# Patient Record
Sex: FEMALE | Race: BLACK OR AFRICAN AMERICAN | NOT HISPANIC OR LATINO | Employment: OTHER | URBAN - METROPOLITAN AREA
[De-identification: names, ages, dates, MRNs, and addresses within clinical notes are randomized per-mention and may not be internally consistent; named-entity substitution may affect disease eponyms.]

---

## 2023-04-19 LAB
CHLAMYDIA TRACH., AMPLIFIED: NEGATIVE
N. GONORRHEA, AMPLIFIED: NEGATIVE
TRICHOMONAS VAGINALIS: NEGATIVE

## 2023-09-26 PROBLEM — J30.9 ALLERGIC RHINITIS: Status: ACTIVE | Noted: 2023-09-26

## 2023-09-26 PROBLEM — J34.3 HYPERTROPHY OF NASAL TURBINATES: Status: ACTIVE | Noted: 2023-09-26

## 2023-09-26 PROBLEM — M54.50 LUMBAGO WITHOUT SCIATICA: Status: ACTIVE | Noted: 2023-09-26

## 2023-09-26 PROBLEM — J34.2 DEVIATED NASAL SEPTUM: Status: ACTIVE | Noted: 2023-09-26

## 2023-09-26 PROBLEM — M99.03 SEGMENTAL AND SOMATIC DYSFUNCTION OF LUMBAR REGION: Status: ACTIVE | Noted: 2023-09-26

## 2023-09-26 PROBLEM — K13.70 MOUTH LESION: Status: ACTIVE | Noted: 2023-09-26

## 2023-09-26 PROBLEM — J35.2 ADENOID HYPERTROPHY: Status: ACTIVE | Noted: 2023-09-26

## 2023-09-26 PROBLEM — H69.90 ETD (EUSTACHIAN TUBE DYSFUNCTION): Status: ACTIVE | Noted: 2023-09-26

## 2023-09-26 PROBLEM — N94.6 DYSMENORRHEA: Status: ACTIVE | Noted: 2023-09-26

## 2023-09-26 PROBLEM — M99.05 SEGMENTAL AND SOMATIC DYSFUNCTION OF PELVIC REGION: Status: ACTIVE | Noted: 2023-09-26

## 2023-09-26 PROBLEM — R63.4 UNINTENDED WEIGHT LOSS: Status: ACTIVE | Noted: 2023-09-26

## 2023-09-26 PROBLEM — R87.610 ATYPICAL SQUAMOUS CELLS OF UNDETERMINED SIGNIFICANCE (ASCUS) ON PAPANICOLAOU SMEAR OF CERVIX: Status: ACTIVE | Noted: 2023-09-26

## 2023-09-26 PROBLEM — M99.02 SEGMENTAL AND SOMATIC DYSFUNCTION OF THORACIC REGION: Status: ACTIVE | Noted: 2023-09-26

## 2023-09-26 RX ORDER — ALBUTEROL SULFATE 90 UG/1
AEROSOL, METERED RESPIRATORY (INHALATION)
COMMUNITY
Start: 2021-12-22

## 2023-09-26 RX ORDER — CYCLOBENZAPRINE HCL 5 MG
5 TABLET ORAL 3 TIMES DAILY
COMMUNITY
Start: 2017-09-08

## 2023-09-26 RX ORDER — NORGESTIMATE AND ETHINYL ESTRADIOL 0.25-0.035
1 KIT ORAL DAILY
COMMUNITY
Start: 2022-04-12 | End: 2024-04-15 | Stop reason: WASHOUT

## 2023-09-26 RX ORDER — IBUPROFEN 600 MG/1
600 TABLET ORAL 3 TIMES DAILY
COMMUNITY
Start: 2017-09-08

## 2023-09-26 RX ORDER — MONTELUKAST SODIUM 10 MG/1
1 TABLET ORAL NIGHTLY
COMMUNITY
Start: 2022-07-25

## 2023-09-26 RX ORDER — IBUPROFEN 200 MG
400 TABLET ORAL EVERY 8 HOURS PRN
COMMUNITY
Start: 2016-12-30

## 2023-09-26 RX ORDER — NORETHINDRONE AND ETHINYL ESTRADIOL 7 DAYS X 3
1 KIT ORAL DAILY
COMMUNITY
End: 2023-10-11 | Stop reason: SDUPTHER

## 2023-09-26 RX ORDER — AZELASTINE 1 MG/ML
1 SPRAY, METERED NASAL 2 TIMES DAILY
COMMUNITY

## 2023-09-26 RX ORDER — ACETAMINOPHEN 325 MG/1
650 TABLET ORAL EVERY 4 HOURS PRN
COMMUNITY
Start: 2016-12-30

## 2023-09-29 NOTE — PROGRESS NOTES
Subjective   Patient ID: Franc Castrejon is a 23 y.o. female who presents October 2, 2023 for No chief complaint on file..    6/15 VPCY    Today, the patient rates their degree of pain as a 4 out of 10 on the numeric pain rating scale.     HPI Diana Bruner returns to my office for chiropractic care. She reports that over the past two weeks she has been experiencing more lower back discomfort than prior. Denies inciting trauma but reports she recently moved homes which may have contributed. Prior to this, patient states she was feeling well overall. No radicular complaints reported. Has been able to workout without issue.    No other changes in health noted.      Objective   Physical Exam  Neurological:      General: No focal deficit present.      Mental Status: She is alert and oriented to person, place, and time.      Cranial Nerves: No dysarthria or facial asymmetry.      Sensory: Sensation is intact.      Motor: Motor function is intact.      Coordination: Coordination is intact.      Gait: Gait is intact. Gait normal.         Palpation of the following region(s) revealed:  Cervical: Upper trapezius bilateral, muscular hypertonicity.  Cervical paraspinals bilateral, muscular hypertonicity.  Thoracic: Thoracic paraspinals bilateral, muscular hypertonicity.  Rhomboids bilateral, muscular hypertonicity.  Pectoralis bilateral, muscular hypertonicity.  Lumbar: Lumbar paraspinals right, hypertonicity and tenderness.  Quadratus lumborum right, hypertonicity and tenderness.  Gluteal bilateral, muscular hypertonicity.  Psoas bilateral, muscular hypertonicity.      Segmental Joint(s): Segmental joint dysfunction was assessed with motion palpation and is identified in the following areas:  Cervical : C7  Thoracic : T1  Lumbopelvic / Sacral SIJ : L4, L5/S1, and R SIJ      Assessment/Plan   Today's Treatment Included: Chiropractic manipulation to the Segmental Joint(s) Cervical : C7 Segmental Joint(s) Lumbopelvic/Sacral SIJ : L4,  L5/S1, and R SIJ Segmental Joint(s) Thoracic : T4, T5, and T8     Pin and stretch  Soft-Tissue manipulation    Techniques: Prone C/S activator; Prone TS mobs; Prone LPS drop table    Soft-tissue mobilization was performed in the following areas:   Upper Trapezius bilateral  Rhomboids bilateral and Pectoralis bilateral  Lumbar Paraspinal mm. bilateral, Quadratus Lumborum bilateral, Gluteal mm. Glute. Max.  and Glute. Med. bilateral, and Psoas bilateral    F/U scheduled with Dr. Joyce in 1 week for different technique.    The patient tolerated today's treatment with little or no additional discomfort and was instructed to contact the office for questions or concerns.

## 2023-10-02 ENCOUNTER — ALLIED HEALTH (OUTPATIENT)
Dept: INTEGRATIVE MEDICINE | Facility: CLINIC | Age: 23
End: 2023-10-02
Payer: COMMERCIAL

## 2023-10-02 DIAGNOSIS — M99.02 SEGMENTAL AND SOMATIC DYSFUNCTION OF THORACIC REGION: ICD-10-CM

## 2023-10-02 DIAGNOSIS — M54.50 BILATERAL LOW BACK PAIN WITHOUT SCIATICA, UNSPECIFIED CHRONICITY: ICD-10-CM

## 2023-10-02 DIAGNOSIS — M99.05 SEGMENTAL AND SOMATIC DYSFUNCTION OF PELVIC REGION: ICD-10-CM

## 2023-10-02 DIAGNOSIS — M99.03 SEGMENTAL AND SOMATIC DYSFUNCTION OF LUMBAR REGION: Primary | ICD-10-CM

## 2023-10-02 DIAGNOSIS — M53.3 SACROILIAC DYSFUNCTION: ICD-10-CM

## 2023-10-02 PROCEDURE — 98941 CHIROPRACT MANJ 3-4 REGIONS: CPT | Performed by: CHIROPRACTOR

## 2023-10-04 ENCOUNTER — TELEPHONE (OUTPATIENT)
Dept: OBSTETRICS AND GYNECOLOGY | Facility: CLINIC | Age: 23
End: 2023-10-04
Payer: COMMERCIAL

## 2023-10-04 DIAGNOSIS — Z30.41 ENCOUNTER FOR SURVEILLANCE OF CONTRACEPTIVE PILLS: Primary | ICD-10-CM

## 2023-10-06 ENCOUNTER — TELEPHONE (OUTPATIENT)
Dept: OBSTETRICS AND GYNECOLOGY | Facility: CLINIC | Age: 23
End: 2023-10-06
Payer: COMMERCIAL

## 2023-10-06 NOTE — TELEPHONE ENCOUNTER
contacted pt  name and  verified  Spoke with pt made aware rx was sent to pharmacy   To call pharmacy to confirm it may be a generic available to her  pt verbalized understanding  no further questions or concerns at this time

## 2023-10-09 ENCOUNTER — ALLIED HEALTH (OUTPATIENT)
Dept: INTEGRATIVE MEDICINE | Facility: CLINIC | Age: 23
End: 2023-10-09
Payer: COMMERCIAL

## 2023-10-09 DIAGNOSIS — M53.3 SACROILIAC DYSFUNCTION: ICD-10-CM

## 2023-10-09 DIAGNOSIS — M99.02 SEGMENTAL AND SOMATIC DYSFUNCTION OF THORACIC REGION: ICD-10-CM

## 2023-10-09 DIAGNOSIS — M99.03 SEGMENTAL AND SOMATIC DYSFUNCTION OF LUMBAR REGION: Primary | ICD-10-CM

## 2023-10-09 DIAGNOSIS — M54.50 BILATERAL LOW BACK PAIN WITHOUT SCIATICA, UNSPECIFIED CHRONICITY: ICD-10-CM

## 2023-10-09 DIAGNOSIS — M99.05 SEGMENTAL AND SOMATIC DYSFUNCTION OF PELVIC REGION: ICD-10-CM

## 2023-10-09 PROCEDURE — 98941 CHIROPRACT MANJ 3-4 REGIONS: CPT | Performed by: CHIROPRACTOR
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
## 2023-10-11 ENCOUNTER — TELEPHONE (OUTPATIENT)
Dept: OBSTETRICS AND GYNECOLOGY | Facility: CLINIC | Age: 23
End: 2023-10-11
Payer: COMMERCIAL

## 2023-10-11 NOTE — TELEPHONE ENCOUNTER
Pt verified by name and .  Nurse calling pt to verify which ocp she is one, 2 are listed in her chart.  Pt states she is taking Nylia .  Pt is aware nurse will send message to Elena Franklin to send rx to pharmacy.  Pt has no further questions or concerns at this time.

## 2023-11-29 ENCOUNTER — TELEPHONE (OUTPATIENT)
Dept: OBSTETRICS AND GYNECOLOGY | Facility: CLINIC | Age: 23
End: 2023-11-29
Payer: COMMERCIAL

## 2023-11-29 DIAGNOSIS — Z30.41 ENCOUNTER FOR SURVEILLANCE OF CONTRACEPTIVE PILLS: ICD-10-CM

## 2024-01-08 ENCOUNTER — ALLIED HEALTH (OUTPATIENT)
Dept: INTEGRATIVE MEDICINE | Facility: CLINIC | Age: 24
End: 2024-01-08
Payer: COMMERCIAL

## 2024-01-08 DIAGNOSIS — M99.02 SEGMENTAL AND SOMATIC DYSFUNCTION OF THORACIC REGION: ICD-10-CM

## 2024-01-08 DIAGNOSIS — M54.50 BILATERAL LOW BACK PAIN WITHOUT SCIATICA, UNSPECIFIED CHRONICITY: ICD-10-CM

## 2024-01-08 DIAGNOSIS — M99.05 SEGMENTAL AND SOMATIC DYSFUNCTION OF PELVIC REGION: ICD-10-CM

## 2024-01-08 DIAGNOSIS — M53.3 SACROILIAC DYSFUNCTION: ICD-10-CM

## 2024-01-08 DIAGNOSIS — M99.03 SEGMENTAL AND SOMATIC DYSFUNCTION OF LUMBAR REGION: Primary | ICD-10-CM

## 2024-01-08 PROCEDURE — 98941 CHIROPRACT MANJ 3-4 REGIONS: CPT | Performed by: CHIROPRACTOR
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
## 2024-01-16 ENCOUNTER — OFFICE VISIT (OUTPATIENT)
Dept: OBSTETRICS AND GYNECOLOGY | Facility: CLINIC | Age: 24
End: 2024-01-16
Payer: COMMERCIAL

## 2024-01-16 VITALS
BODY MASS INDEX: 25.3 KG/M2 | HEIGHT: 63 IN | DIASTOLIC BLOOD PRESSURE: 70 MMHG | SYSTOLIC BLOOD PRESSURE: 110 MMHG | WEIGHT: 142.8 LBS

## 2024-01-16 DIAGNOSIS — Z11.3 ROUTINE SCREENING FOR STI (SEXUALLY TRANSMITTED INFECTION): Primary | ICD-10-CM

## 2024-01-16 PROCEDURE — 87800 DETECT AGNT MULT DNA DIREC: CPT

## 2024-01-16 PROCEDURE — 87661 TRICHOMONAS VAGINALIS AMPLIF: CPT

## 2024-01-16 PROCEDURE — 99213 OFFICE O/P EST LOW 20 MIN: CPT | Performed by: MIDWIFE

## 2024-01-16 RX ORDER — NORGESTIMATE AND ETHINYL ESTRADIOL 0.25-0.035
1 KIT ORAL DAILY
Qty: 28 TABLET | Refills: 4 | Status: SHIPPED | OUTPATIENT
Start: 2024-01-16 | End: 2025-01-15

## 2024-01-16 ASSESSMENT — ENCOUNTER SYMPTOMS
PSYCHIATRIC NEGATIVE: 0
EYES NEGATIVE: 0
CONSTITUTIONAL NEGATIVE: 0
RESPIRATORY NEGATIVE: 0
MUSCULOSKELETAL NEGATIVE: 0
NEUROLOGICAL NEGATIVE: 0
GASTROINTESTINAL NEGATIVE: 0
CARDIOVASCULAR NEGATIVE: 0
HEMATOLOGIC/LYMPHATIC NEGATIVE: 0
ENDOCRINE NEGATIVE: 0
ALLERGIC/IMMUNOLOGIC NEGATIVE: 0

## 2024-01-16 ASSESSMENT — PAIN SCALES - GENERAL: PAINLEVEL: 0-NO PAIN

## 2024-01-16 NOTE — PROGRESS NOTES
22 y/o here for gc/ct screen declines bloodwork.  Also needs refill on ocp- sprintec-28  Will need APE/pap in 04/2024    A/P: 22 y/o  here for sti screen          Encounter for refill ocp    Gc/ct  Sprintec -28 x 4 cycles  Jessica Diaz, ELSIE-CNM

## 2024-01-17 LAB
C TRACH RRNA SPEC QL NAA+PROBE: NEGATIVE
N GONORRHOEA DNA SPEC QL PROBE+SIG AMP: NEGATIVE
T VAGINALIS RRNA SPEC QL NAA+PROBE: NEGATIVE

## 2024-01-30 ENCOUNTER — APPOINTMENT (OUTPATIENT)
Dept: OBSTETRICS AND GYNECOLOGY | Facility: CLINIC | Age: 24
End: 2024-01-30
Payer: COMMERCIAL

## 2024-02-22 DIAGNOSIS — Z30.41 ENCOUNTER FOR SURVEILLANCE OF CONTRACEPTIVE PILLS: ICD-10-CM

## 2024-02-22 NOTE — TELEPHONE ENCOUNTER
Patient request OCP refill.  Was last seen in May 2023, has appointment scheduled in April.  Med pended to provider to sign.

## 2024-03-12 NOTE — ADDENDUM NOTE
Addended by: SUZAN SÁNCHEZ on: 10/11/2023 02:45 PM     Modules accepted: Orders     oriented to person, place and time

## 2024-04-02 ENCOUNTER — APPOINTMENT (OUTPATIENT)
Dept: OBSTETRICS AND GYNECOLOGY | Facility: CLINIC | Age: 24
End: 2024-04-02
Payer: COMMERCIAL

## 2024-04-15 ENCOUNTER — OFFICE VISIT (OUTPATIENT)
Dept: OBSTETRICS AND GYNECOLOGY | Facility: CLINIC | Age: 24
End: 2024-04-15
Payer: COMMERCIAL

## 2024-04-15 ENCOUNTER — TELEPHONE (OUTPATIENT)
Dept: OBSTETRICS AND GYNECOLOGY | Facility: CLINIC | Age: 24
End: 2024-04-15

## 2024-04-15 VITALS
SYSTOLIC BLOOD PRESSURE: 110 MMHG | WEIGHT: 138.38 LBS | BODY MASS INDEX: 24.51 KG/M2 | DIASTOLIC BLOOD PRESSURE: 60 MMHG

## 2024-04-15 DIAGNOSIS — Z01.419 WELL WOMAN EXAM: Primary | ICD-10-CM

## 2024-04-15 DIAGNOSIS — Z30.41 ENCOUNTER FOR SURVEILLANCE OF CONTRACEPTIVE PILLS: ICD-10-CM

## 2024-04-15 DIAGNOSIS — Z12.4 SCREENING FOR CERVICAL CANCER: ICD-10-CM

## 2024-04-15 PROCEDURE — 99395 PREV VISIT EST AGE 18-39: CPT | Performed by: ADVANCED PRACTICE MIDWIFE

## 2024-04-15 PROCEDURE — 88141 CYTOPATH C/V INTERPRET: CPT | Performed by: PATHOLOGY

## 2024-04-15 PROCEDURE — 87624 HPV HI-RISK TYP POOLED RSLT: CPT

## 2024-04-15 PROCEDURE — 88175 CYTOPATH C/V AUTO FLUID REDO: CPT

## 2024-04-15 ASSESSMENT — PAIN SCALES - GENERAL: PAINLEVEL: 0-NO PAIN

## 2024-04-15 NOTE — PROGRESS NOTES
Assessment/Plan   Problem List Items Addressed This Visit    None  Visit Diagnoses         Codes    Well woman exam    -  Primary Z01.419    Screening for cervical cancer     Z12.4            Brigette Sanz, APRN-CNM     Subjective   Franc Castrejon is a 24 y.o. female who is here for a routine exam. Periods are regular every 28-30 days, lasting 5 days. Dysmenorrhea:moderate, occurring premenstrually. Cyclic symptoms include breast tenderness. No intermenstrual bleeding, spotting, or discharge.    Current contraception: condoms  History of abnormal Pap smear: yes - biopsy done, last year pap normal  History of LEEP or cryo:  no  Family history of uterine or ovarian cancer: no  Family history of breast cancer: no  Regular self breast exam: yes  History of abnormal mammogram: no    She has issues with lubrication: no  She reports issue with orgasms: no  She reports pain with sexual activity: no  She reports sexual activity with: (male 1)    ROS    Objective   /60   LMP 03/18/2024 (Approximate)     General:   Alert and oriented x 3   Heart:  Thyroid: Regular rate, rhythm  Euthyroid, normal shape and size   Lungs:  Breast: Clear to auscultation bilaterally  Symmetrical, no skin changes/nipple discharge, redness, tenderness, no masses palpated bilaterally   Abdomen: Soft, non tender   Vulva: EGBUS normal   Vagina: Pink, normal discharge   Cervix: No CMT   Uterus: Normal shape, size   Adnexa: NT bilaterally       Thank you for coming to see me for your visit today and most importantly thank you for having a preventative visit.  If lab work was sent, you will see your test result via [facility]  Any prescriptions will be sent electronically to your pharmacy listed    I look forward to seeing you next year for your health care needs.  Please remember:   Sleep is important!   Exercise such as walking for 20 minutes per day is beneficial to your physical and mental health   Sleep is so important and should be a  priority!   Healthy diets can be expensive -- if you every feel like you are struggling to afford healthy food, please let me know as we have a very good program called [employer] that is available to you   Decrease alcohol and tobacco    Be well!  Xochitl

## 2024-04-15 NOTE — TELEPHONE ENCOUNTER
Contacted pt and verified name and .  Pt confirmed she takes Notrel, pt was in office today for a visit.  Order has been sent to her provider, pt made aware to allow provider time to sign order.  Pt states understanding and denies having questions at this time.

## 2024-04-17 ENCOUNTER — TELEPHONE (OUTPATIENT)
Dept: OBSTETRICS AND GYNECOLOGY | Facility: CLINIC | Age: 24
End: 2024-04-17
Payer: COMMERCIAL

## 2024-04-17 DIAGNOSIS — N39.0 URINARY TRACT INFECTION WITHOUT HEMATURIA, SITE UNSPECIFIED: ICD-10-CM

## 2024-04-17 NOTE — TELEPHONE ENCOUNTER
"Contacted pt and verified name and .  Pt states since she had her pap and pelvic exam done on Monday she has been feeling \"different down there\". Pt states she feels pressure and urinary urgency but she isnt voiding. Pt states she has burning when she urinates.   Pt made message will be forwarded to provider.  Pt states understanding and denies having questions at this time.    "

## 2024-04-17 NOTE — TELEPHONE ENCOUNTER
Attempted to call pt for medical question.  Pt did not answer, left VM to return call to clinic.

## 2024-04-18 ENCOUNTER — CLINICAL SUPPORT (OUTPATIENT)
Dept: OBSTETRICS AND GYNECOLOGY | Facility: CLINIC | Age: 24
End: 2024-04-18
Payer: COMMERCIAL

## 2024-04-18 DIAGNOSIS — R39.15 URINARY URGENCY: Primary | ICD-10-CM

## 2024-04-18 DIAGNOSIS — R39.15 URINARY URGENCY: ICD-10-CM

## 2024-04-18 PROCEDURE — 87086 URINE CULTURE/COLONY COUNT: CPT

## 2024-04-18 PROCEDURE — 81001 URINALYSIS AUTO W/SCOPE: CPT

## 2024-04-18 NOTE — PROGRESS NOTES
Pt verified by name and .  P her to leave urine specimen for urinalysis, urine culture.  Pt has no questions

## 2024-04-19 LAB
APPEARANCE UR: CLEAR
BACTERIA #/AREA URNS AUTO: ABNORMAL /HPF
BACTERIA UR CULT: NORMAL
BILIRUB UR STRIP.AUTO-MCNC: NEGATIVE MG/DL
COLOR UR: COLORLESS
GLUCOSE UR STRIP.AUTO-MCNC: NORMAL MG/DL
KETONES UR STRIP.AUTO-MCNC: NEGATIVE MG/DL
LEUKOCYTE ESTERASE UR QL STRIP.AUTO: NEGATIVE
NITRITE UR QL STRIP.AUTO: NEGATIVE
PH UR STRIP.AUTO: 6.5 [PH]
PROT UR STRIP.AUTO-MCNC: NEGATIVE MG/DL
RBC # UR STRIP.AUTO: ABNORMAL /UL
RBC #/AREA URNS AUTO: ABNORMAL /HPF
SP GR UR STRIP.AUTO: 1
SQUAMOUS #/AREA URNS AUTO: ABNORMAL /HPF
UROBILINOGEN UR STRIP.AUTO-MCNC: NORMAL MG/DL
WBC #/AREA URNS AUTO: ABNORMAL /HPF

## 2024-04-26 LAB
CYTOLOGY CMNT CVX/VAG CYTO-IMP: NORMAL
HPV HR 12 DNA GENITAL QL NAA+PROBE: POSITIVE
HPV HR GENOTYPES PNL CVX NAA+PROBE: POSITIVE
HPV16 DNA SPEC QL NAA+PROBE: NEGATIVE
HPV18 DNA SPEC QL NAA+PROBE: NEGATIVE
LAB AP HPV GENOTYPE QUESTION: YES
LAB AP HPV HR: NORMAL
LAB AP PREVIOUS ABNORMAL HISTORY: NORMAL
LABORATORY COMMENT REPORT: NORMAL
LMP START DATE: NORMAL
PATH REPORT.TOTAL CANCER: NORMAL

## 2024-04-29 ENCOUNTER — TELEPHONE (OUTPATIENT)
Dept: OBSTETRICS AND GYNECOLOGY | Facility: CLINIC | Age: 24
End: 2024-04-29
Payer: COMMERCIAL

## 2024-04-29 NOTE — TELEPHONE ENCOUNTER
Name/ verified  Pt informed of her recent PAP results with +HPV high risk. Education provided to pt on HPV. Pt needs Colpo. Informed pt that Colposcopy is a way of looking at the cervix through a special magnifying device called a Colposcope. It shines a light onto the vagina and cervix. The provider will pour a vinegar like solution over the cervix and if any abnormal cells show, then a small biopsy will be taken and sent to the lab. It is normal to have some mild cramping and spotting/bleeding after if a sample was taken.  Pt may use Motrin prior to arrival.  Pt scheduled Colpo and verbalized understanding.

## 2024-05-28 ENCOUNTER — OFFICE VISIT (OUTPATIENT)
Dept: OBSTETRICS AND GYNECOLOGY | Facility: CLINIC | Age: 24
End: 2024-05-28
Payer: COMMERCIAL

## 2024-05-28 VITALS
HEIGHT: 63 IN | SYSTOLIC BLOOD PRESSURE: 110 MMHG | WEIGHT: 142 LBS | DIASTOLIC BLOOD PRESSURE: 70 MMHG | BODY MASS INDEX: 25.16 KG/M2

## 2024-05-28 DIAGNOSIS — R87.610 ASCUS WITH POSITIVE HIGH RISK HPV CERVICAL: Primary | ICD-10-CM

## 2024-05-28 DIAGNOSIS — R87.810 ASCUS WITH POSITIVE HIGH RISK HPV CERVICAL: Primary | ICD-10-CM

## 2024-05-28 LAB — PREGNANCY TEST URINE, POC: NEGATIVE

## 2024-05-28 PROCEDURE — 57454 BX/CURETT OF CERVIX W/SCOPE: CPT | Performed by: ADVANCED PRACTICE MIDWIFE

## 2024-05-28 PROCEDURE — 81025 URINE PREGNANCY TEST: CPT | Performed by: ADVANCED PRACTICE MIDWIFE

## 2024-05-28 ASSESSMENT — PAIN SCALES - GENERAL: PAINLEVEL: 0-NO PAIN

## 2024-05-28 NOTE — PROGRESS NOTES
Colposcopy    Date/Time: 5/28/2024 2:21 PM    Performed by: MACI Jones  Authorized by: MACI Jones    Consent:     Patient questions answered: yes      Risks and benefits of the procedure and its alternatives discussed: yes      Procedural risks discussed:  Bleeding and infection    Consent obtained:  Verbal    Consent given by:  Patient  Pre-procedure:     Prep solution(s): acetic acid    Procedure:     Colposcopy with: cervical biopsy and endocervical curettage      Cervix visibility: fully visualized      SCJ visibility: fully visualized      Lesion visualized: fully visualized      Acetowhite lesion(s): cervix      Acetowhite lesion(s) comment:  None    Acetowhite lesion(s) description:  Small area 12 o'clock - changes with acetowhite c/w IKE 1    Vascular changes comment:  None    Tampon inserted: no    Post-procedure:     Patient tolerance of procedure:  Patient tolerated the procedure well with no immediate complications    Instructions and paperwork completed: yes

## 2024-06-18 LAB
LAB AP ASR DISCLAIMER: NORMAL
LABORATORY COMMENT REPORT: NORMAL
PATH REPORT.COMMENTS IMP SPEC: NORMAL
PATH REPORT.FINAL DX SPEC: NORMAL
PATH REPORT.GROSS SPEC: NORMAL
PATH REPORT.RELEVANT HX SPEC: NORMAL
PATH REPORT.TOTAL CANCER: NORMAL
RESIDENT REVIEW: NORMAL

## 2024-06-18 PROCEDURE — 88342 IMHCHEM/IMCYTCHM 1ST ANTB: CPT

## 2024-06-19 ENCOUNTER — TELEPHONE (OUTPATIENT)
Dept: OBSTETRICS AND GYNECOLOGY | Facility: CLINIC | Age: 24
End: 2024-06-19

## 2024-06-19 NOTE — TELEPHONE ENCOUNTER
----- Message from MACI Jones sent at 6/18/2024  3:47 PM EDT -----  Needs appointment with MD for a LEEP - please notify and schedule

## 2024-07-17 ENCOUNTER — TELEPHONE (OUTPATIENT)
Dept: OBSTETRICS AND GYNECOLOGY | Facility: CLINIC | Age: 24
End: 2024-07-17

## 2024-07-17 NOTE — TELEPHONE ENCOUNTER
Called pt, no answer, left voicemail for return call  Pt has refills at pharmacy ready to request refill from both [facility]  Left number to [facility] so pt can call  [phone]

## 2024-08-14 ENCOUNTER — APPOINTMENT (OUTPATIENT)
Dept: OBSTETRICS AND GYNECOLOGY | Facility: HOSPITAL | Age: 24
End: 2024-08-14

## 2024-08-21 ENCOUNTER — PROCEDURE VISIT (OUTPATIENT)
Dept: OBSTETRICS AND GYNECOLOGY | Facility: HOSPITAL | Age: 24
End: 2024-08-21
Payer: MEDICARE

## 2024-08-21 VITALS
WEIGHT: 153.2 LBS | OXYGEN SATURATION: 99 % | SYSTOLIC BLOOD PRESSURE: 122 MMHG | TEMPERATURE: 98 F | RESPIRATION RATE: 15 BRPM | HEART RATE: 99 BPM | DIASTOLIC BLOOD PRESSURE: 73 MMHG | BODY MASS INDEX: 27.14 KG/M2

## 2024-08-21 DIAGNOSIS — N87.1 DYSPLASIA OF CERVIX, HIGH GRADE CIN 2: Primary | ICD-10-CM

## 2024-08-21 DIAGNOSIS — N89.8 VAGINAL DISCHARGE: ICD-10-CM

## 2024-08-21 LAB — PREGNANCY TEST URINE, POC: NEGATIVE

## 2024-08-21 PROCEDURE — 81025 URINE PREGNANCY TEST: CPT | Performed by: OBSTETRICS & GYNECOLOGY

## 2024-08-21 PROCEDURE — 57460 BX OF CERVIX W/SCOPE LEEP: CPT | Performed by: OBSTETRICS & GYNECOLOGY

## 2024-08-21 PROCEDURE — 87205 SMEAR GRAM STAIN: CPT

## 2024-08-21 PROCEDURE — 81025 URINE PREGNANCY TEST: CPT

## 2024-08-21 ASSESSMENT — ENCOUNTER SYMPTOMS
CARDIOVASCULAR NEGATIVE: 0
CONSTITUTIONAL NEGATIVE: 0
RESPIRATORY NEGATIVE: 0
EYES NEGATIVE: 0
GASTROINTESTINAL NEGATIVE: 0
PSYCHIATRIC NEGATIVE: 0
ABDOMINAL PAIN: 0
NEUROLOGICAL NEGATIVE: 0
ENDOCRINE NEGATIVE: 0
HEMATOLOGIC/LYMPHATIC NEGATIVE: 0
PSYCHIATRIC NEGATIVE: 1
MUSCULOSKELETAL NEGATIVE: 0
ALLERGIC/IMMUNOLOGIC NEGATIVE: 0
UNEXPECTED WEIGHT CHANGE: 0

## 2024-08-21 ASSESSMENT — PAIN SCALES - GENERAL: PAINLEVEL: 0-NO PAIN

## 2024-08-21 NOTE — PROGRESS NOTES
Subjective   Pt is doing well today. Has been having some increased discharge over the past few weeks that has a slight odor to it. Otherwise doing well, no other complaints at this time.     Patient ID: Franc Castrejon is a 24 y.o. female who presents for Procedure (LEEP procedure/Last PAP 4/2024 ascus, HPV+/Colpo IKE 2 on 5/28/24\/Chaperone: Declined//Oliva Hallman MSN, RN, CLC )    Prior history as follows:    2021 ASCUS, no HPV results  2022 Normal pap   4/2023 ASCUS, no HPV results  5/2023 endocervical curetting with benign fragments; 9 o'clock biopsy endocervical musoca with chronic inflammation and no squamous mucosa identified  4/2024 ASCUS, HPV high risk and non- 16/18 positive  5/2024 Colpo with IKE 2 at 12 o'clock    Smoking status: never a smoker  Contraception: OCPs  HPV vaccination: yes complete in [id number]  HIV status: is HIV negative in 10/2022    Review of Systems   Constitutional:  Negative for unexpected weight change.   Gastrointestinal:  Negative for abdominal pain.   Genitourinary:  Negative for vaginal bleeding.   Psychiatric/Behavioral: Negative.        Physical Exam   Constitutional: No visible distress, alert and cooperative  Eyes: clear sclera  Head/Neck: Normocephalic  Respiratory/Thorax: Normal respiratory effort on RA  Musculoskeletal: grossly normal ROM  Extremities: BLEs symmetrical   Neurological: A&Ox3  Psychological: Appropriate mood and behavior  Skin: warm, dry, no lesions     Colposcopy    Date/Time: 8/21/2024 2:45 PM    Performed by: Jenny Hancock MD  Authorized by: Corinne A Bazella, MD    Procedure location: cervix    Consent:     Patient questions answered: yes      Risks and benefits of the procedure and its alternatives discussed: yes      Procedural risks discussed:  Bleeding, repeat procedure and failure rate    Consent obtained:  Written    Consent given by:  Patient  Indication:     Cervical indication(s): IKE 2      Other indication(s): clinical abnormality     Pre-procedure:     Speculum was placed in the vagina: yes      Prep solution(s): acetic acid and Lugol's iodine      Local anesthetic:  Lidocaine 1% WITH epi    Total amount used (mL):  20  Procedure:     Colposcopy with: LEEP      Cervix visibility: fully visualized      SCJ visibility: fully visualized      Lesion visualized: fully visualized      Acetowhite lesion(s): cervix      Cervical impression: high grade      Intracervical block was performed: yes      Speculum meets coating standards for use with lasers: yes      Electrocautery: blend      Cautery loop used: yes      Cautery loop depth of excision (mm):  8    Cautery loop radius of excision (mm):  15    Top Hat performed: no      Endometrial sampling: curettage      Cautery location(s): cervical edge      Margin extension of the exocervix (mm):  1    Ball cautery with coag current performed: yes      LEEP details:  Adequate colposcopy performed after application of dilute acetic acid and Lugol's solution to the cervix.  There were some acetowhite changes noted at 12 o'clock.  LEEP was taken from 12 o'clock to 6 o'clock with full excision of lesion and an ECC was also obtained.  Monopolar coagulation, Monsel's, and pressure were used for hemostasis.  The patient tolerated the procedure well.    Ferric subsulfate solution applied: no      Tampon inserted: no    Post-procedure:     Patient tolerance of procedure:  Patient tolerated the procedure well with no immediate complications    Estimated blood loss (mL):  5    Instructions and paperwork completed: yes      Educational handouts given: no                Assessment:  Colposcopic impression:  Master index  Color 1  Margins 1  Vessels 0  Iodine staining 1  Total 3, consistent with IKE 1-2.    PLAN: LEEP performed, will order pathology study and plan to repeat pap in 6 months.     Pt with increased discharge with slight odor, collected vaginitis swab for study.     Supriya Hancock MD PGY-1

## 2024-08-22 DIAGNOSIS — N76.0 BV (BACTERIAL VAGINOSIS): Primary | ICD-10-CM

## 2024-08-22 DIAGNOSIS — B96.89 BV (BACTERIAL VAGINOSIS): Primary | ICD-10-CM

## 2024-08-22 LAB
CLUE CELLS VAG LPF-#/AREA: PRESENT /[LPF]
NUGENT SCORE: 8
YEAST VAG WET PREP-#/AREA: ABNORMAL

## 2024-08-22 RX ORDER — METRONIDAZOLE 500 MG/1
500 TABLET ORAL 2 TIMES DAILY
Qty: 14 TABLET | Refills: 0 | Status: SHIPPED | OUTPATIENT
Start: 2024-08-22 | End: 2024-08-29

## 2024-08-28 ENCOUNTER — TELEPHONE (OUTPATIENT)
Dept: OBSTETRICS AND GYNECOLOGY | Facility: CLINIC | Age: 24
End: 2024-08-28
Payer: MEDICARE

## 2024-08-28 ENCOUNTER — TELEPHONE (OUTPATIENT)
Dept: OBSTETRICS AND GYNECOLOGY | Facility: HOSPITAL | Age: 24
End: 2024-08-28
Payer: MEDICARE

## 2024-08-28 NOTE — TELEPHONE ENCOUNTER
Name/ verified  RN called pt to follow up with recent office visit billing.  RN was on phone with pt and then disconnected.

## 2024-08-28 NOTE — TELEPHONE ENCOUNTER
Name/ verified  Pt and pt mother on phone. Pt is concerned with receiving a bill for her LEEP procedure. She states that she was told by someone she spoke with that her insurance would cover the procedure.  Apologies provided. RN provided pt with Billing Department phone number to assist with this matter.

## 2024-09-03 LAB
LAB AP ASR DISCLAIMER: NORMAL
LABORATORY COMMENT REPORT: NORMAL
PATH REPORT.FINAL DX SPEC: NORMAL
PATH REPORT.GROSS SPEC: NORMAL
PATH REPORT.RELEVANT HX SPEC: NORMAL
PATH REPORT.TOTAL CANCER: NORMAL

## 2024-09-04 ENCOUNTER — TELEPHONE (OUTPATIENT)
Dept: OBSTETRICS AND GYNECOLOGY | Facility: CLINIC | Age: 24
End: 2024-09-04
Payer: MEDICARE

## 2024-09-04 NOTE — TELEPHONE ENCOUNTER
Name/ verified  Rn returned phone call to F/U if pt has spoke with billing in regards to recent procedure (cost). Pt stated she spoke with someone from billing yesterday and they would be calling pt back. Pt will schedule F/U apt in 4-6 wks.

## 2024-09-10 ENCOUNTER — TELEPHONE (OUTPATIENT)
Dept: OBSTETRICS AND GYNECOLOGY | Facility: HOSPITAL | Age: 24
End: 2024-09-10
Payer: MEDICARE

## 2024-09-10 NOTE — TELEPHONE ENCOUNTER
----- Message from Ananda Valdez sent at 9/9/2024  8:08 PM EDT -----  Regarding: FUV from LEEP  Hello could this patient be scheduled with any obgyn physician post op from leep. In the next two weeks is fine    Contacted patient and scheduled her for follow up visit after LEEP procedure 9/30 with Dr. Doran  Patient states she is having insurance trouble and may not be able to make it to appointment if her insurance is out of network  Instructed patient to call office if anything changes but that we do recommend she come in for POV if she is able to  Patient verbalized understanding  Debbie Cronin RN

## 2024-09-30 ENCOUNTER — OFFICE VISIT (OUTPATIENT)
Dept: OBSTETRICS AND GYNECOLOGY | Facility: HOSPITAL | Age: 24
End: 2024-09-30
Payer: MEDICARE

## 2024-09-30 VITALS — BODY MASS INDEX: 26.75 KG/M2 | SYSTOLIC BLOOD PRESSURE: 115 MMHG | WEIGHT: 151 LBS | DIASTOLIC BLOOD PRESSURE: 74 MMHG

## 2024-09-30 DIAGNOSIS — D06.9 CIN III WITH SEVERE DYSPLASIA: Primary | ICD-10-CM

## 2024-09-30 PROCEDURE — 99212 OFFICE O/P EST SF 10 MIN: CPT | Performed by: OBSTETRICS & GYNECOLOGY

## 2024-09-30 PROCEDURE — 1036F TOBACCO NON-USER: CPT | Performed by: OBSTETRICS & GYNECOLOGY

## 2024-09-30 ASSESSMENT — ENCOUNTER SYMPTOMS
DEPRESSION: 0
OCCASIONAL FEELINGS OF UNSTEADINESS: 0
LOSS OF SENSATION IN FEET: 0

## 2024-09-30 ASSESSMENT — PAIN SCALES - GENERAL: PAINLEVEL: 0-NO PAIN

## 2024-09-30 NOTE — PROGRESS NOTES
Subjective   Patient ID: Franc Castrejon is a 24 y.o. female who presents for Follow-up (Patient here for follow up after LEEP/Patient had LEEP 8-21-24/LMP 9-6-24/Patient denies falls/Patient denies pain/Patient has no concerns after LEEP).  Here for follow-up after LEEP.   Path A. Cervix, LEEP:  -- High grade squamous intraepithelial lesion (HSIL / IKE 2-3), extending into endocervical glands, involving squamocolumnar mucosa.  -- Immunohistochemical stain performed on formalin-fixed, paraffin embedded section (blocks A2 and A3) demonstrates p16 to be positive (block pattern) in dysplastic cells.   -- Ectocervical, endocervical and stromal (deep) margins are negative for dysplasia.  -- Multiple levels examined for final diagnosis.      B. Endocervix, curettage:  -- Fragments of cytologically benign endocervix.     No complaints. Discussed follow-up Pap in 6 months.   Has been sexually active since procedure with no concerns.     Review of Systems   All other systems reviewed and are negative.    Objective   Physical Exam  Constitutional:       Appearance: Normal appearance. She is normal weight.   HENT:      Head: Normocephalic and atraumatic.   Genitourinary:     General: Normal vulva.      Exam position: Lithotomy position.      Vagina: Normal.      Cervix: Normal.   Musculoskeletal:         General: Normal range of motion.   Skin:     General: Skin is warm and dry.   Neurological:      General: No focal deficit present.      Mental Status: She is alert and oriented to person, place, and time.   Psychiatric:         Mood and Affect: Mood normal.         Thought Content: Thought content normal.       Assessment/Plan   Problem List Items Addressed This Visit    None  Visit Diagnoses         Codes    IKE III with severe dysplasia    -  Primary D06.9        Follow-up Pap 5 months.          Chloe Doran MD 09/30/24 3:25 PM

## 2024-10-28 ENCOUNTER — HOSPITAL ENCOUNTER (EMERGENCY)
Facility: HOSPITAL | Age: 24
Discharge: OTHER INSTITUTION | End: 2024-10-28
Payer: MEDICARE

## 2024-10-28 PROCEDURE — 4500999001 HC ED NO CHARGE

## 2025-03-31 ENCOUNTER — APPOINTMENT (OUTPATIENT)
Dept: OBSTETRICS AND GYNECOLOGY | Facility: HOSPITAL | Age: 25
End: 2025-03-31
Payer: MEDICARE

## 2025-04-01 ENCOUNTER — TELEPHONE (OUTPATIENT)
Dept: OBSTETRICS AND GYNECOLOGY | Facility: HOSPITAL | Age: 25
End: 2025-04-01
Payer: MEDICARE

## 2025-04-01 NOTE — TELEPHONE ENCOUNTER
----- Message from Ananda Valdez sent at 9/9/2024  8:08 PM EDT -----  Regarding: FUV from [facility]  Hello could this patient be scheduled with any obgyn physician post op from [facility]. In the next two weeks is fine

## 2025-04-01 NOTE — TELEPHONE ENCOUNTER
RN called patient to schedule annual and repeat pap. Pt name and  verified. Pt states she was scheduled, but someone from  called to cancel her appt due to her insurance not covering the visit. Pt is looking for gyn care somewhere that is covered by her insurance. Pt advised to contact her insurance to see where her insurance will be accepted and to schedule an appt for a annual and pap. Pt encourage to continue with her gyn care because she had a LEEP leap last year. Pt verbalized understanding. All questions and concerns addressed.  RHINA Moy

## 2025-04-22 ENCOUNTER — TELEPHONE (OUTPATIENT)
Dept: OBSTETRICS AND GYNECOLOGY | Facility: CLINIC | Age: 25
End: 2025-04-22
Payer: MEDICARE

## 2025-04-23 NOTE — TELEPHONE ENCOUNTER
Nurse called patient's listed telephone number [phone] in attempt to address b/c questions but nurse received patient's voicemail and left a message asking for a callback at the office. Office number provided.    RHINA Castrejon

## 2025-05-29 ENCOUNTER — APPOINTMENT (OUTPATIENT)
Dept: OBSTETRICS AND GYNECOLOGY | Facility: CLINIC | Age: 25
End: 2025-05-29
Payer: MEDICARE

## 2025-06-01 NOTE — PROGRESS NOTES
"Assessment/Plan   {Assess/PlanSmarinks:59226}    Follow up in 1 year for annual well woman exam, or sooner as needed.  Melissa L Zahorsky, ELSIE-LUIS A     Subjective   Franc Castrejon is a 25 y.o. female who is here for a routine annual exam and repeat pap s/p LEEP procedure.     Age at menarche:  No LMP recorded.  Periods are {gyn period regularity:715}, lasting {numbers; 0-10:15658} days. Dysmenorrhea: {gyn dysmenorrhea:716}. Cyclic symptoms include {sys gyn cyclic sx:51675}. Denies intermenstrual bleeding, spotting, or discharge.    {Childbearing or Misti/postmenopausal?:90328}    Sexual Activity: {Sexual activity (female):61851}  Pain with intercourse? {Yes/No:70618}  Loss of desire? {Yes/No:22012}  Able to have an orgasm? {Yes/No:95258}    History of prior STI: {STI:23817}  Desires STI screening? {yes/no:24526}    Current contraception: {contraceptive method:5051}  Happy with it?   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   Current/past IPV?   Occupation:   Exercise:   Alcohol/Tobacco/Drug use:    OB History   No obstetric history on file.        Objective   There were no vitals taken for this visit.    General: Pleasant, cooperative, in no apparent distress.  Thyroid: Normal size, symmetrical, smooth and non-tender to palpation.  CV: Regular rate and rhythm.  Resp: Normal respiratory effort. Clear to auscultation bilaterally.  Breasts: Symmetrical, no skin changes, nipple discharge, erythema, masses. Non-tender to palpation.  Abdomen: Soft, non-tender.  : Normal external genitalia " appearance with even hair distribution. Adnexa without tenderness or masses. Uterus firm, midline, mobile, and appropriate size. Vaginal walls pink and rugated with small amount of white, physiologic discharge. Cervix pink and firm. No lesions, no petechiae, no CMT.         Thank you for coming to see me for your visit today and, most importantly, thank you for having a preventative visit.  If lab work was sent, you will see your test result via [employer] and will be notified of any pertinent abnormalities.  Any prescriptions will be sent electronically to your pharmacy listed.  Follow up in 1 year for your annual exam or sooner as needed.    I look forward to seeing you next year for your health care needs.  Please remember:   Sleep is important - make it a priority to get at least 6 hours per night!   Exercise, such as walking for 20 minutes per day, is beneficial to your physical and mental health.   Healthy diets can be expensive. If you ever feel like you are struggling to afford healthy food, please let me know, as we have a very good program called [employer] that is available to you.   Decrease alcohol and tobacco intake. A goal of less than 7 alcoholic drinks per week can reduce your risk of breast and colon cancer by 38%!    Be well!  Marla

## 2025-06-02 ENCOUNTER — APPOINTMENT (OUTPATIENT)
Dept: OBSTETRICS AND GYNECOLOGY | Facility: CLINIC | Age: 25
End: 2025-06-02

## 2025-06-02 ENCOUNTER — APPOINTMENT (OUTPATIENT)
Dept: OBSTETRICS AND GYNECOLOGY | Facility: CLINIC | Age: 25
End: 2025-06-02
Payer: MEDICARE

## 2025-06-02 VITALS
DIASTOLIC BLOOD PRESSURE: 70 MMHG | WEIGHT: 153.8 LBS | HEIGHT: 63 IN | BODY MASS INDEX: 27.25 KG/M2 | SYSTOLIC BLOOD PRESSURE: 108 MMHG

## 2025-06-02 DIAGNOSIS — N89.8 VAGINAL ITCHING: ICD-10-CM

## 2025-06-02 DIAGNOSIS — Z12.4 CERVICAL CANCER SCREENING: ICD-10-CM

## 2025-06-02 DIAGNOSIS — Z01.419 WELL WOMAN EXAM: Primary | ICD-10-CM

## 2025-06-02 DIAGNOSIS — Z30.41 ENCOUNTER FOR SURVEILLANCE OF CONTRACEPTIVE PILLS: ICD-10-CM

## 2025-06-02 DIAGNOSIS — R30.0 DYSURIA: ICD-10-CM

## 2025-06-02 LAB
POC APPEARANCE, URINE: ABNORMAL
POC BILIRUBIN, URINE: ABNORMAL
POC BLOOD, URINE: NEGATIVE
POC COLOR, URINE: ABNORMAL
POC GLUCOSE, URINE: NEGATIVE MG/DL
POC KETONES, URINE: ABNORMAL MG/DL
POC LEUKOCYTES, URINE: ABNORMAL
POC NITRITE,URINE: NEGATIVE
POC PH, URINE: 7 PH
POC PROTEIN, URINE: ABNORMAL MG/DL
POC SPECIFIC GRAVITY, URINE: 1.02
POC UROBILINOGEN, URINE: 4 EU/DL

## 2025-06-02 PROCEDURE — 81003 URINALYSIS AUTO W/O SCOPE: CPT

## 2025-06-02 PROCEDURE — 87591 N.GONORRHOEAE DNA AMP PROB: CPT

## 2025-06-02 PROCEDURE — 87661 TRICHOMONAS VAGINALIS AMPLIF: CPT

## 2025-06-02 PROCEDURE — 3008F BODY MASS INDEX DOCD: CPT

## 2025-06-02 PROCEDURE — 87491 CHLMYD TRACH DNA AMP PROBE: CPT

## 2025-06-02 PROCEDURE — 1036F TOBACCO NON-USER: CPT

## 2025-06-02 PROCEDURE — 99395 PREV VISIT EST AGE 18-39: CPT

## 2025-06-02 RX ORDER — CLINDAMYCIN PHOSPHATE 20 MG/G
1 CREAM VAGINAL NIGHTLY
Qty: 40 G | Refills: 0 | Status: SHIPPED | OUTPATIENT
Start: 2025-06-02 | End: 2025-06-09

## 2025-06-02 RX ORDER — NORETHINDRONE AND ETHINYL ESTRADIOL 1 MG-35MCG
1 KIT ORAL DAILY
Qty: 84 TABLET | Refills: 12 | Status: SHIPPED | OUTPATIENT
Start: 2025-06-02

## 2025-06-02 ASSESSMENT — ENCOUNTER SYMPTOMS
LOSS OF SENSATION IN FEET: 0
DEPRESSION: 0
OCCASIONAL FEELINGS OF UNSTEADINESS: 0

## 2025-06-02 ASSESSMENT — PAIN SCALES - GENERAL: PAINLEVEL_OUTOF10: 0-NO PAIN

## 2025-06-02 NOTE — PATIENT INSTRUCTIONS
Thank you for coming to see me for your visit today and, most importantly, thank you for having a preventative visit.  If lab work was sent, you will see your test result via [employer] and will be notified of any pertinent abnormalities.  Any prescriptions will be sent electronically to your pharmacy listed.  Follow up in 1 year for your annual exam or sooner as needed.    I look forward to seeing you next year for your health care needs.  Please remember:   Sleep is important - make it a priority to get at least 6 hours per night!   Exercise, such as walking for 20 minutes per day, is beneficial to your physical and mental health.   Healthy diets can be expensive. If you ever feel like you are struggling to afford healthy food, please let me know, as we have a very good program called [employer] that is available to you.   Decrease alcohol and tobacco intake. A goal of less than 7 alcoholic drinks per week can reduce your risk of breast and colon cancer by 38%!    Be well!  Marla

## 2025-06-03 LAB
BV SCORE VAG QL: ABNORMAL
C TRACH RRNA SPEC QL NAA+PROBE: NEGATIVE
N GONORRHOEA DNA SPEC QL PROBE+SIG AMP: NEGATIVE
T VAGINALIS RRNA SPEC QL NAA+PROBE: NEGATIVE

## 2025-06-09 ENCOUNTER — TELEPHONE (OUTPATIENT)
Dept: OBSTETRICS AND GYNECOLOGY | Facility: CLINIC | Age: 25
End: 2025-06-09
Payer: MEDICARE

## 2025-06-09 NOTE — TELEPHONE ENCOUNTER
THIS PATIENT CALLED TO VERIFY THE MEDICATION THAT WAS PRESCRIBED FOR TREATMENT, (metroNIDAZOLE (Flagyl) 500 mg tablet ) AND THE (clindamycin (Cleocin) 2 % vaginal cream ,IS FOR HER PARTNER TO USE.  PER OUR NURSE KARL THIS INFORMATION WAS CONFIRMED.    THE PATIENT UNDERSTOOD THE clindamycin (Cleocin) 2 % vaginal cream PRESCRIBED PREVIOUSLY ON 06/02/2025 WAS TREATMENT FOR HERSELF.          THANK YOU

## 2025-06-16 ENCOUNTER — APPOINTMENT (OUTPATIENT)
Dept: OBSTETRICS AND GYNECOLOGY | Facility: CLINIC | Age: 25
End: 2025-06-16
Payer: MEDICARE

## 2025-06-27 LAB
CYTOLOGY CMNT CVX/VAG CYTO-IMP: NORMAL
LAB AP HPV GENOTYPE QUESTION: YES
LAB AP HPV HR: NORMAL
LAB AP PAP ADDITIONAL TESTS: NORMAL
LABORATORY COMMENT REPORT: NORMAL
LMP START DATE: NORMAL
PATH REPORT.TOTAL CANCER: NORMAL
RESIDENT REVIEW: NORMAL

## 2025-06-30 ENCOUNTER — TELEPHONE (OUTPATIENT)
Dept: OBSTETRICS AND GYNECOLOGY | Facility: CLINIC | Age: 25
End: 2025-06-30
Payer: MEDICARE

## 2025-06-30 NOTE — TELEPHONE ENCOUNTER
Patient called office back to discuss pap results. Informed patient that her recent pap came back showing LSIL and that she will need to be scheduled for a colposcopy. Patient has had a colposcopy in the past and denies need for further education. Patient scheduled colposcopy for 8/25 at [facility] [address] with Dr. España. Instructed patient to take tylenol/ibuprofen 1 hour prior to procedure to help with any cramping. Encouraged patient to call office with any questions or concerns.   Debbie Cronin 06/30/25 3:33 PM

## 2025-06-30 NOTE — TELEPHONE ENCOUNTER
----- Message from Melissa Zahorsky sent at 6/28/2025  3:17 PM EDT -----  Patient needs a colposcopy with an MD. Can you please assist with getting patient scheduled?  ----- Message -----  From: Ceci Shipley MA  Sent: 6/2/2025   3:49 PM EDT  To: Melissa L Zahorsky, APRN-CNM

## 2025-07-02 ENCOUNTER — APPOINTMENT (OUTPATIENT)
Dept: URGENT CARE | Age: 25
End: 2025-07-02

## 2025-08-25 ENCOUNTER — PROCEDURE VISIT (OUTPATIENT)
Dept: OBSTETRICS AND GYNECOLOGY | Facility: HOSPITAL | Age: 25
End: 2025-08-25

## 2025-08-25 VITALS
BODY MASS INDEX: 25.72 KG/M2 | WEIGHT: 145.2 LBS | HEART RATE: 81 BPM | DIASTOLIC BLOOD PRESSURE: 76 MMHG | SYSTOLIC BLOOD PRESSURE: 110 MMHG

## 2025-08-25 DIAGNOSIS — R87.612 LGSIL OF CERVIX OF UNDETERMINED SIGNIFICANCE: Primary | ICD-10-CM

## 2025-08-25 DIAGNOSIS — R87.612 LGSIL ON PAP SMEAR OF CERVIX: ICD-10-CM

## 2025-08-25 DIAGNOSIS — N76.1 CHRONIC VAGINITIS: ICD-10-CM

## 2025-08-25 LAB — PREGNANCY TEST URINE, POC: NEGATIVE

## 2025-08-25 PROCEDURE — 57456 ENDOCERV CURETTAGE W/SCOPE: CPT | Performed by: STUDENT IN AN ORGANIZED HEALTH CARE EDUCATION/TRAINING PROGRAM

## 2025-08-25 PROCEDURE — 81025 URINE PREGNANCY TEST: CPT | Performed by: STUDENT IN AN ORGANIZED HEALTH CARE EDUCATION/TRAINING PROGRAM

## 2025-08-26 LAB — BV SCORE VAG QL: ABNORMAL

## 2025-08-27 DIAGNOSIS — N76.0 BV (BACTERIAL VAGINOSIS): Primary | ICD-10-CM

## 2025-08-27 DIAGNOSIS — B96.89 BV (BACTERIAL VAGINOSIS): Primary | ICD-10-CM

## 2025-08-27 RX ORDER — METRONIDAZOLE 7.5 MG/G
GEL VAGINAL DAILY
Qty: 70 G | Refills: 0 | Status: SHIPPED | OUTPATIENT
Start: 2025-08-27 | End: 2025-09-01

## 2025-08-27 RX ORDER — METRONIDAZOLE 500 MG/1
500 TABLET ORAL 2 TIMES DAILY
Qty: 14 TABLET | Refills: 0 | Status: SHIPPED | OUTPATIENT
Start: 2025-08-27 | End: 2025-09-03

## 2025-08-29 LAB
LABORATORY COMMENT REPORT: NORMAL
PATH REPORT.FINAL DX SPEC: NORMAL
PATH REPORT.GROSS SPEC: NORMAL
PATH REPORT.RELEVANT HX SPEC: NORMAL
PATH REPORT.TOTAL CANCER: NORMAL